# Patient Record
Sex: FEMALE | Employment: OTHER
[De-identification: names, ages, dates, MRNs, and addresses within clinical notes are randomized per-mention and may not be internally consistent; named-entity substitution may affect disease eponyms.]

---

## 2022-07-30 ENCOUNTER — TELEPHONE ENCOUNTER (OUTPATIENT)
Age: 72
End: 2022-07-30

## 2022-07-31 ENCOUNTER — TELEPHONE ENCOUNTER (OUTPATIENT)
Age: 72
End: 2022-07-31

## 2025-01-20 ENCOUNTER — P2P PATIENT RECORD (OUTPATIENT)
Age: 75
End: 2025-01-20

## 2025-02-12 ENCOUNTER — TELEPHONE ENCOUNTER (OUTPATIENT)
Dept: URBAN - METROPOLITAN AREA CLINIC 7 | Facility: CLINIC | Age: 75
End: 2025-02-12

## 2025-02-14 ENCOUNTER — OFFICE VISIT (OUTPATIENT)
Dept: URBAN - METROPOLITAN AREA CLINIC 60 | Facility: CLINIC | Age: 75
End: 2025-02-14

## 2025-03-11 ENCOUNTER — DASHBOARD ENCOUNTERS (OUTPATIENT)
Age: 75
End: 2025-03-11

## 2025-03-11 ENCOUNTER — OFFICE VISIT (OUTPATIENT)
Dept: URBAN - METROPOLITAN AREA CLINIC 7 | Facility: CLINIC | Age: 75
End: 2025-03-11
Payer: MEDICARE

## 2025-03-11 VITALS
HEART RATE: 75 BPM | OXYGEN SATURATION: 98 % | DIASTOLIC BLOOD PRESSURE: 90 MMHG | SYSTOLIC BLOOD PRESSURE: 130 MMHG | WEIGHT: 130 LBS | HEIGHT: 62 IN | TEMPERATURE: 98 F | BODY MASS INDEX: 23.92 KG/M2

## 2025-03-11 DIAGNOSIS — K55.9 MESENTERIC ISCHEMIA: ICD-10-CM

## 2025-03-11 DIAGNOSIS — K52.9 CHRONIC DIARRHEA: ICD-10-CM

## 2025-03-11 DIAGNOSIS — Z90.49 S/P SMALL BOWEL RESECTION: ICD-10-CM

## 2025-03-11 DIAGNOSIS — J44.9 COPD, SEVERE: ICD-10-CM

## 2025-03-11 DIAGNOSIS — Z99.81 DEPENDENCE ON SUPPLEMENTAL OXYGEN: ICD-10-CM

## 2025-03-11 PROBLEM — 313299006: Status: ACTIVE | Noted: 2025-03-11

## 2025-03-11 PROBLEM — 82196007: Status: ACTIVE | Noted: 2025-03-11

## 2025-03-11 PROBLEM — 236071009: Status: ACTIVE | Noted: 2025-03-11

## 2025-03-11 PROBLEM — 931000119107: Status: ACTIVE | Noted: 2025-03-11

## 2025-03-11 PROBLEM — 347861000119105: Status: ACTIVE | Noted: 2025-03-11

## 2025-03-11 PROCEDURE — 99204 OFFICE O/P NEW MOD 45 MIN: CPT | Performed by: INTERNAL MEDICINE

## 2025-03-11 RX ORDER — METOPROLOL SUCCINATE 25 MG/1
TAKE 1 TABLET TWICE A DAY BY ORAL ROUTE FOR 90 DAYS TABLET, EXTENDED RELEASE ORAL
Qty: 180 EACH | Refills: 1 | Status: ACTIVE | COMMUNITY

## 2025-03-11 RX ORDER — HYDROCODONE BITARTRATE AND ACETAMINOPHEN 5; 325 MG/1; MG/1
1 TABLET AS NEEDED TABLET ORAL
Refills: 0 | Status: ACTIVE | COMMUNITY
Start: 2025-03-11

## 2025-03-11 RX ORDER — LEVOTHYROXINE SODIUM 75 UG/1
TABLET ORAL
Qty: 90 TABLET | Status: ACTIVE | COMMUNITY

## 2025-03-11 RX ORDER — FLUTICASONE FUROATE, UMECLIDINIUM BROMIDE AND VILANTEROL TRIFENATATE 100; 62.5; 25 UG/1; UG/1; UG/1
INHALE 1 PUFF INTO THE LUNGS DAILY. RINSE MOUTH AFTER USE POWDER RESPIRATORY (INHALATION)
Qty: 60 EACH | Refills: 0 | Status: ACTIVE | COMMUNITY

## 2025-03-11 RX ORDER — VENLAFAXINE HYDROCHLORIDE 75 MG/1
CAPSULE, EXTENDED RELEASE ORAL
Qty: 90 CAPSULE | Status: ACTIVE | COMMUNITY

## 2025-03-11 RX ORDER — LOPERAMIDE HYDROCHLORIDE 2 MG/1
1 TABLET AS NEEDED TABLET, FILM COATED ORAL
Status: ACTIVE | COMMUNITY

## 2025-03-11 RX ORDER — LOSARTAN POTASSIUM 50 MG/1
TAKE 2 TABLETS BY MOUTH EVERY DAY TABLET, FILM COATED ORAL
Qty: 180 EACH | Refills: 0 | Status: ACTIVE | COMMUNITY

## 2025-03-11 RX ORDER — APIXABAN 5 MG/1
TABLET, FILM COATED ORAL
Qty: 180 TABLET | Status: ACTIVE | COMMUNITY

## 2025-03-11 NOTE — HPI-TODAY'S VISIT:
Seen now s/p admission [facility] with mesenteric ischemia and sepsis. Required emegent surgery and then prolonged admission rehab. Hx severe COPD and on cont oxygen support. No current abd pain No nausea or vomiting. No recent fevers or chills Has noted a  change in bowel habits with diarrhea that is recurrnt since time of surgery. No recent stool studies. Taking immodium prn No dark urine or alcoholic stools. No significant NSAID use history.

## 2025-03-21 ENCOUNTER — TELEPHONE ENCOUNTER (OUTPATIENT)
Dept: URBAN - METROPOLITAN AREA CLINIC 7 | Facility: CLINIC | Age: 75
End: 2025-03-21

## 2025-03-21 RX ORDER — VANCOMYCIN HYDROCHLORIDE 125 MG/1
1 CAPSULE CAPSULE ORAL
Qty: 56 CAPSULE | Refills: 0 | OUTPATIENT
Start: 2025-03-21 | End: 2025-04-04